# Patient Record
Sex: FEMALE | Race: WHITE
[De-identification: names, ages, dates, MRNs, and addresses within clinical notes are randomized per-mention and may not be internally consistent; named-entity substitution may affect disease eponyms.]

---

## 2018-12-29 ENCOUNTER — HOSPITAL ENCOUNTER (EMERGENCY)
Dept: HOSPITAL 25 - UCCORT | Age: 58
Discharge: HOME | End: 2018-12-29
Payer: COMMERCIAL

## 2018-12-29 VITALS — DIASTOLIC BLOOD PRESSURE: 80 MMHG | SYSTOLIC BLOOD PRESSURE: 132 MMHG

## 2018-12-29 DIAGNOSIS — F17.210: ICD-10-CM

## 2018-12-29 DIAGNOSIS — B95.0: ICD-10-CM

## 2018-12-29 DIAGNOSIS — J36: ICD-10-CM

## 2018-12-29 DIAGNOSIS — J02.0: Primary | ICD-10-CM

## 2018-12-29 DIAGNOSIS — E07.9: ICD-10-CM

## 2018-12-29 DIAGNOSIS — K21.9: ICD-10-CM

## 2018-12-29 DIAGNOSIS — Z88.6: ICD-10-CM

## 2018-12-29 DIAGNOSIS — G25.81: ICD-10-CM

## 2018-12-29 PROCEDURE — 87651 STREP A DNA AMP PROBE: CPT

## 2018-12-29 PROCEDURE — 96372 THER/PROPH/DIAG INJ SC/IM: CPT

## 2018-12-29 PROCEDURE — G0463 HOSPITAL OUTPT CLINIC VISIT: HCPCS

## 2018-12-29 PROCEDURE — 99202 OFFICE O/P NEW SF 15 MIN: CPT

## 2018-12-29 RX ADMIN — LIDOCAINE HYDROCHLORIDE ONE ML: 20 SOLUTION ORAL; TOPICAL at 16:39

## 2018-12-29 RX ADMIN — LIDOCAINE HYDROCHLORIDE ONE ML: 20 SOLUTION ORAL; TOPICAL at 16:34

## 2018-12-29 NOTE — UC
UC General HPI





- HPI Summary


HPI Summary: 





2 DAY HX SORE THROAT, SWELLING R SIDE OF NECK AND R EAR PAIN.


+ FEVER. SEVERE PAIN WITH SWALLOW BUT NO TROUBLE PASSING SECRETIONS.


STATES NOT DRINKING OR EATING DUE TO PAIN.





- History of Current Complaint


Chief Complaint: UCRespiratory


Stated Complaint: THROAT CONCERN


Time Seen by Provider: 12/29/18 15:10


Hx Obtained From: Patient


Onset/Duration: Gradual Onset


Timing: Constant


Pain Intensity: 7


Associated Signs & Symptoms: Positive: Fever





- Allergy/Home Medications


Allergies/Adverse Reactions: 


 Allergies











Allergy/AdvReac Type Severity Reaction Status Date / Time


 


ibuprofen Allergy  Rash Verified 12/29/18 14:59











Home Medications: 


 Home Medications





Cholecalciferol (Vitamin D3) [D 5000] 5,000 unit PO WEEKLY 12/29/18 [History 

Confirmed 12/29/18]


Levothyroxine TAB* [Synthroid TAB*] 100 mcg PO DAILY 12/29/18 [History 

Confirmed 12/29/18]


Magnesium [Magnesium Elemental] 30 mg PO DAILY 12/29/18 [History Confirmed 12/29 /18]


Montelukast Sodium TAB* [Singulair TAB*] 10 mg PO BEDTIME 12/29/18 [History 

Confirmed 12/29/18]


Ropinirole TAB* [Requip TAB*] 0.5 mg PO TID 12/29/18 [History Confirmed 12/29/18

]


raNITIdine HCl [Ranitidine HCl] 150 mg PO BID 12/29/18 [History Confirmed 12/29/ 18]











PMH/Surg Hx/FS Hx/Imm Hx





- Additional Past Medical History


Additional PMH: 





RLS, CHRONIC BACK PAIN


Endocrine History: Thyroid Disease


GI/ History: Gastroesophageal Reflux





- Surgical History


Surgical History: Yes


Surgery Procedure, Year, and Place: L knee replacement, R ACL repair





- Family History


Known Family History: Positive: Non-Contributory





- Social History


Alcohol Use: None


Substance Use Type: None


Smoking Status (MU): Heavy Every Day Tobacco Smoker


Type: Cigarettes


Amount Used/How Often: 1/2 ppd


Length of Time of Smoking/Using Tobacco: since age 18





- Immunization History


Vaccination Up to Date: Yes





Review of Systems


All Other Systems Reviewed And Are Negative: Yes


Constitutional: Positive: Fever


Skin: Positive: Negative


Eyes: Positive: Negative


ENT: Positive: Sore Throat


Respiratory: Positive: Negative


Cardiovascular: Positive: Negative


Gastrointestinal: Positive: Negative


Genitourinary: Positive: Negative


Motor: Positive: Negative


Neurovascular: Positive: Negative


Musculoskeletal: Positive: Negative


Neurological: Positive: Negative


Psychological: Positive: Negative


Is Patient Immunocompromised?: No





Physical Exam


Triage Information Reviewed: Yes


Appearance: Well-Appearing


Vital Signs: 


 Initial Vital Signs











Temp  99 F   12/29/18 14:49


 


Pulse  97   12/29/18 14:49


 


Resp  18   12/29/18 14:49


 


BP  123/71   12/29/18 14:49


 


Pulse Ox  99   12/29/18 14:49











Vital Signs Reviewed: Yes


Eyes: Positive: Conjunctiva Clear


ENT: Positive: Pharyngeal erythema, TM dull - X2, Tonsillar swelling - R>L, 

Tonsillar exudate - R>L, Uvula midline.  Negative: Nasal congestion, Nasal 

drainage, Trismus, Muffled voice, Hoarse voice


Neck: Positive: Supple, Tenderness @ - R PERITONSILAR NODES THAT IS ENLARGED


Respiratory: Positive: Lungs clear, Normal breath sounds


Cardiovascular: Positive: RRR, No Murmur


Abdomen Description: Positive: Nontender, No Organomegaly, Soft


Bowel Sounds: Positive: Present


Musculoskeletal: Positive: ROM Intact


Neurological: Positive: Alert


Psychological: Positive: Age Appropriate Behavior


Skin Exam: Normal





Diagnostics





- Laboratory


Diagnostic Studies Completed/Ordered: RAPID STREP OF THROAT IS POSITIVE.





Re-Evaluation





- Re-Evaluation


  ** First Eval


Re-Evaluation Time: 17:03


Change: Improved - states able to swallow and is feeling better.





Course/Dx





- Course


Course Of Treatment: Unable to establish IV access after 4 attempts thus will 

tx IM and po fluids.  need for close f/u stressed at time of discharge plus go 

to ER if not improved by tomorrow or sooner if worse to which pt agrees.





- Differential Dx - Multi-Symptom


Differential Diagnoses: Other - strep throat, peritonsillar abscess, 

peritonsillar cellulitis. no uvular shift or muffled voice thus do not feel an 

abscess. rapid strep was positive.





- Diagnoses


Provider Diagnosis: 


 Strep throat, Peritonsillar cellulitis








Discharge





- Sign-Out/Discharge


Documenting (check all that apply): Patient Departure


All imaging exams completed and their final reports reviewed: No Studies





- Discharge Plan


Condition: Stable


Disposition: HOME


Prescriptions: 


Amoxicillin/Clavulanate 600 [Augmentin Es-600 (NF)] 600 mg PO BID 10 Days #100 

ml


Patient Education Materials:  Tonsillitis (ED), Strep Throat (ED)


Referrals: 


Polo Storey MD [Medical Doctor] - 


Additional Instructions: 


FOLLOW UP ENT, DR STOREY THIS COMING WEDNESDAY 11/2/19.


GO TO THE ER IF NOT IMPROVED BY TOMORROW.


GO TO ER IMMEDIATELY FOR ANY WORSENING.





- Billing Disposition and Condition


Condition: STABLE


Disposition: Home